# Patient Record
Sex: MALE | Race: NATIVE HAWAIIAN OR OTHER PACIFIC ISLANDER | NOT HISPANIC OR LATINO | URBAN - METROPOLITAN AREA
[De-identification: names, ages, dates, MRNs, and addresses within clinical notes are randomized per-mention and may not be internally consistent; named-entity substitution may affect disease eponyms.]

---

## 2020-12-23 ENCOUNTER — EMERGENCY (EMERGENCY)
Facility: HOSPITAL | Age: 35
LOS: 1 days | Discharge: HOME | End: 2020-12-23
Attending: EMERGENCY MEDICINE | Admitting: EMERGENCY MEDICINE
Payer: SELF-PAY

## 2020-12-23 VITALS
RESPIRATION RATE: 18 BRPM | HEIGHT: 66 IN | OXYGEN SATURATION: 97 % | HEART RATE: 78 BPM | SYSTOLIC BLOOD PRESSURE: 143 MMHG | WEIGHT: 175.05 LBS | TEMPERATURE: 98 F | DIASTOLIC BLOOD PRESSURE: 81 MMHG

## 2020-12-23 DIAGNOSIS — J45.901 UNSPECIFIED ASTHMA WITH (ACUTE) EXACERBATION: ICD-10-CM

## 2020-12-23 DIAGNOSIS — J45.909 UNSPECIFIED ASTHMA, UNCOMPLICATED: ICD-10-CM

## 2020-12-23 DIAGNOSIS — F17.200 NICOTINE DEPENDENCE, UNSPECIFIED, UNCOMPLICATED: ICD-10-CM

## 2020-12-23 PROCEDURE — 71046 X-RAY EXAM CHEST 2 VIEWS: CPT | Mod: 26

## 2020-12-23 PROCEDURE — 99284 EMERGENCY DEPT VISIT MOD MDM: CPT

## 2020-12-23 RX ORDER — IPRATROPIUM/ALBUTEROL SULFATE 18-103MCG
3 AEROSOL WITH ADAPTER (GRAM) INHALATION ONCE
Refills: 0 | Status: COMPLETED | OUTPATIENT
Start: 2020-12-23 | End: 2020-12-23

## 2020-12-23 RX ORDER — SODIUM CHLORIDE 9 MG/ML
1000 INJECTION INTRAMUSCULAR; INTRAVENOUS; SUBCUTANEOUS ONCE
Refills: 0 | Status: COMPLETED | OUTPATIENT
Start: 2020-12-23 | End: 2020-12-23

## 2020-12-23 RX ADMIN — SODIUM CHLORIDE 1000 MILLILITER(S): 9 INJECTION INTRAMUSCULAR; INTRAVENOUS; SUBCUTANEOUS at 23:31

## 2020-12-23 NOTE — ED PROVIDER NOTE - OBJECTIVE STATEMENT
35M with a h/o asthma, no h of intubation and has not had an exacerbation in a while so has no meds at home who p/w SOB x 3 days. He was given duoneb and solumedrol 125mg IV en route with some improvement but still feels like his lungs are tight. Endorses cough with white phlegm, no f/c, no sob, no leg swelling, no PE RF. Occassionally smokes (5-6 cig/week).

## 2020-12-23 NOTE — ED ADULT TRIAGE NOTE - ARRIVAL INFO ADDITIONAL COMMENTS
pt was at urgent care for covid swab and mentioned some left rib soreness, an ekg was done and thought to be abnormal and he was sent here.

## 2020-12-23 NOTE — ED PROVIDER NOTE - NSFOLLOWUPCLINICS_GEN_ALL_ED_FT
[facility]  Family Medicine  [facility]. [address]  [place], [place] [facility]  Phone: [phone]  Fax:   Follow Up Time:

## 2020-12-23 NOTE — ED PROVIDER NOTE - PATIENT PORTAL LINK FT
You can access the FollowMyHealth Patient Portal offered by [facility] by registering at the following website: [email or web address]. By joining [facility]’s FollowMyHealth portal, you will also be able to view your health information using other applications (apps) compatible with our system.

## 2020-12-23 NOTE — ED ADULT NURSE NOTE - OBJECTIVE STATEMENT
36 y/o male w/ PMH ?asthma presents to ED w/ c/o sudden onset difficulty breathing this evening while shaving - pt reports he does not have any rescue inhalers or prescription medications for his asthma so he steamed water and used vicks vapor rub to assist w/ his breathing w/o improvement, pt activated EMS. given neb tx and IV steroids by EMS in route w/ improvement. on arrival, pt denies SOB/CP/difficulty breathing, , O2 sat 93% on RA, and RR 22. pt denies recent illnesses, denies known sick contacts. denies fevers/chills/n/v/d 34 y/o male w/ PMH ?asthma presents to ED w/ c/o mild CP and SOB x3 days w/ sudden onset difficulty breathing this evening while shaving - pt reports he does not have any rescue inhalers or prescription medications for his asthma so he steamed water and used vicks vapor rub to assist w/ his breathing w/o improvement, pt activated EMS. given neb tx and IV steroids by EMS in route w/ improvement. on arrival, pt denies SOB/CP/difficulty breathing, , O2 sat 93% on RA, and RR 22. pt denies recent illnesses, denies known sick contacts. denies fevers/chills/n/v/d

## 2020-12-23 NOTE — ED PROVIDER NOTE - PROGRESS NOTE DETAILS
Pt feels much  better after neb and IVFs, wheezing improved, no resp distress.   RX for albuterol MDI and pred x 4 days sent, pt informed to f/u with PMD for mgmt of asthma. pt also advised to stop smoking.

## 2020-12-23 NOTE — ED PROVIDER NOTE - PHYSICAL EXAMINATION
GEN: Well appearing, well nourished, awake, alert, oriented to person, place, time/situation and in no apparent distress. NTAF  ENT: Airway patent, Nasal mucosa clear. Mouth with normal mucosa.  EYES: Clear bilaterally. PERRL, EOMI  RESPIRATORY: End expiratory wheezes, no hypoxia or resp distress.  CARDIAC: Regular rate and rhythm, no M/R/G  ABDOMEN: Soft, nontender, +bowel sounds, no rebound, rigidity, or guarding.  MSK: Range of motion is not limited, no deformities noted.  NEURO: Alert and oriented, no focal deficits.  SKIN: Skin normal color for race, warm, dry and intact. No evidence of rash.  PSYCH: Alert and oriented to person, place, time/situation. normal mood and affect. no apparent risk to self or others.

## 2020-12-23 NOTE — ED PROVIDER NOTE - NSFOLLOWUPINSTRUCTIONS_ED_ALL_ED_FT
Please follow up with your primary care physician. If you have any problem getting an appointment please call the ED Referral Coordinator at [phone].  Return to the Emergency Department if you have any new or worsening symptoms, or for any other concerns. Please read below for further information.    Asthma    Asthma is a condition in which the airways tighten and narrow, making it difficult to breath. Asthma episodes, also called asthma attacks, range from minor to life-threatening. Symptoms include wheezing, coughing, chest tightness, or shortness of breath. The diagnosis of asthma is made by a review of your medical history and a physical exam, but may involve additional testing. Asthma cannot be cured, but medicines and lifestyle changes can help control it. Avoid triggers of asthma which may include animal dander, pollen, mold, smoke, air pollutants, etc.     SEEK IMMEDIATE MEDICAL CARE IF YOU HAVE ANY OF THE FOLLOWING SYMPTOMS: worsening of symptoms, shortness of breath at rest, chest pain, bluish discoloration to lips or fingertips, lightheadedness/dizziness, or fever. Please take albuterol inhaler 2 puffs every 4 hours for 48 hrs and then after that as needed for shortness or breath, cough, and/or wheezing. Taking prednisone for 4 days. Follow up with primary care doctor for management of your asthma. Stop smoking.  Stay hydrated.    Please follow up with your primary care physician. If you have any problem getting an appointment please call the ED Referral Coordinator at [phone].  Return to the Emergency Department if you have any new or worsening symptoms, or for any other concerns. Please read below for further information.    Asthma    Asthma is a condition in which the airways tighten and narrow, making it difficult to breath. Asthma episodes, also called asthma attacks, range from minor to life-threatening. Symptoms include wheezing, coughing, chest tightness, or shortness of breath. The diagnosis of asthma is made by a review of your medical history and a physical exam, but may involve additional testing. Asthma cannot be cured, but medicines and lifestyle changes can help control it. Avoid triggers of asthma which may include animal dander, pollen, mold, smoke, air pollutants, etc.     SEEK IMMEDIATE MEDICAL CARE IF YOU HAVE ANY OF THE FOLLOWING SYMPTOMS: worsening of symptoms, shortness of breath at rest, chest pain, bluish discoloration to lips or fingertips, lightheadedness/dizziness, or fever.      How to Stop Smoking    WHAT YOU NEED TO KNOW:    You will improve your health and the health of others around you if you stop smoking. Your risk for heart and lung disease, cancer, stroke, heart attack, and vision problems will also decrease. Your adolescent can help prevent or stop harm to his or her brain or body. This will help him or her become a healthy adult. You can benefit from quitting no matter how long you have smoked.    DISCHARGE INSTRUCTIONS:    Prepare to stop smoking: Nicotine is a highly addictive drug found in cigarettes. Withdrawal symptoms can happen when you stop smoking and make it hard to quit. These include anxiety, depression, irritability, trouble sleeping, and increased appetite. You increase your chances of success if you prepare to quit.   •Set a quit date. Pick a date that is within the next 2 weeks. Do not pick a day that you think may be stressful or busy. Write down the day or [place] it on your calender.      •Tell friends and family that you plan to quit. Explain that you may have withdrawal symptoms when you try to quit. Ask them to support you. They may be able to encourage you and help reduce your stress to make it easier for you to quit.      •Make a list of your reasons for quitting. Put the list somewhere you will see it every day, such as your refrigerator. You can look at the list when you have a craving.      •Remove all tobacco and nicotine products from your home, car, and workplace. Also, remove anything else that will tempt you to smoke, such as lighters, matches, or ashtrays. Clean your car, home, and places at work that smell like smoke. The smell of smoke can trigger a craving.      •Identify triggers that make you want to smoke. This may include activities, feelings, or people. Also write down 1 way you can deal with each of your triggers. For example, if you want to smoke as soon as you wake up, plan another activity during this time, such as exercise.      •Make a plan for how you will quit. Learn about the tools that can help you quit, such as medicine, counseling, or nicotine replacement therapy. Choose at least 2 options to help you quit.      •Help your adolescent make a plan to quit. The plan will be more successful if your adolescent makes his or her own decisions. Do not try to pressure him or her to quit immediately or in a certain way. Be supportive and offer help if needed.      Tools to help you stop smoking:   •Counseling from a trained healthcare provider can provide you with support and skills to quit smoking. The provider will also teach you to manage your withdrawal symptoms and cravings. You may receive counseling from one counselor, in group therapy, or through phone therapy called a quit line.      •Nicotine replacement therapy (NRT) such as nicotine patches, gum, or lozenges may help reduce your nicotine cravings. You may get these without a doctor's order. Do not use e-cigarettes or smokeless tobacco in place of cigarettes or to help you quit. They still contain nicotine.      •Prescription medicines such as nasal sprays or nicotine inhalers may help reduce your withdrawal symptoms. Other medicines may also be used to reduce your urge to smoke. Ask your healthcare provider about these medicines. You may need to start certain medicines 2 weeks before your quit date for them to work well.      •Hypnosis is a practice that helps guide you through thoughts and feelings. Hypnosis may help decrease your cravings and make you more willing to quit.      •Acupuncture therapy uses very thin needles to balance energy channels in the body. This is thought to help decrease cravings and symptoms of nicotine withdrawal.      •Support groups let you talk to others who are trying to quit or have already quit. It may be helpful to speak with others about how they quit.      Manage your cravings:   •Avoid situations, people, and places that tempt you to smoke. Go to nonsmoking places, such as libraries or restaurants. Understand what tempts you and try to avoid these things.      •Keep your hands busy. Hold things such as a stress ball or pen.      •Put candy or toothpicks in your mouth. Keep lollipops, sugarless gum, or toothpicks with you at all times.      •Do not have alcohol or caffeine. These drinks may tempt you to smoke. Drink healthy liquids such as water or juice instead.      •Reward yourself when you resist your cravings. Rewards will motivate you and help you stay positive.      •Do an activity that distracts you from your craving. Examples include cleaning, creating art, or gardening.      Prevent weight gain after you quit: You may gain a few pounds after you quit smoking. It is healthier for you to gain a few pounds than to continue to smoke. The following can help you prevent weight gain:   •Eat healthy foods. These include fruits, vegetables, whole-grain breads, low-fat dairy products, beans, lean meats, and fish. Eat healthy snacks, such as low-fat yogurt, if you get hungry between meals.       •Drink water before, during, and between meals. This will make your stomach feel full and help prevent you from overeating. Ask your healthcare provider how much liquid to drink each day and which liquids are best for you.      •Be physically active. Activity may help reduce your cravings and reduce stress. Take a walk or do some kind of physical activity every day. Ask your healthcare provider which activities are right for you.  Family Walking for Exercise           For support and more information:   •Smokefree.gov  Phone: 1-166.871.2514  Web Address: www.smokefree.gov

## 2020-12-23 NOTE — ED ADULT NURSE NOTE - CHPI ED NUR SYMPTOMS POS
pt noted to have audible bilateral inspiratory wheezes at bases w/ decreased aeration/DIFFICULTY BREATHING/SHORTNESS OF BREATH/WHEEZING

## 2020-12-23 NOTE — ED ADULT NURSE NOTE - CHPI ED NUR SYMPTOMS NEG
no body aches/no chest pain/no chills/no cough/no diaphoresis/no edema/no fever/no headache/no hemoptysis

## 2020-12-23 NOTE — ED ADULT TRIAGE NOTE - CHIEF COMPLAINT QUOTE
PT BIBA w/ c/o dyspnea and chest tightness x 3 days. PT states hx of asthma, denies hx of intubation.
ekg abnormality

## 2020-12-23 NOTE — ED ADULT NURSE NOTE - CHIEF COMPLAINT QUOTE
PT BIBA w/ c/o dyspnea and chest tightness x 3 days. PT states hx of asthma, denies hx of intubation.

## 2020-12-24 VITALS
DIASTOLIC BLOOD PRESSURE: 70 MMHG | RESPIRATION RATE: 20 BRPM | SYSTOLIC BLOOD PRESSURE: 111 MMHG | TEMPERATURE: 98 F | OXYGEN SATURATION: 96 % | HEART RATE: 96 BPM

## 2020-12-24 PROCEDURE — 94640 AIRWAY INHALATION TREATMENT: CPT

## 2020-12-24 PROCEDURE — 99284 EMERGENCY DEPT VISIT MOD MDM: CPT | Mod: 25

## 2020-12-24 PROCEDURE — 96360 HYDRATION IV INFUSION INIT: CPT

## 2020-12-24 PROCEDURE — 71046 X-RAY EXAM CHEST 2 VIEWS: CPT

## 2020-12-24 RX ORDER — ALBUTEROL 90 UG/1
1 AEROSOL, METERED ORAL ONCE
Refills: 0 | Status: COMPLETED | OUTPATIENT
Start: 2020-12-24 | End: 2020-12-24

## 2020-12-24 RX ORDER — ALBUTEROL 90 UG/1
2 AEROSOL, METERED ORAL
Qty: 1 | Refills: 0
Start: 2020-12-24

## 2020-12-24 RX ADMIN — SODIUM CHLORIDE 1000 MILLILITER(S): 9 INJECTION INTRAMUSCULAR; INTRAVENOUS; SUBCUTANEOUS at 00:15

## 2020-12-24 RX ADMIN — ALBUTEROL 1 PUFF(S): 90 AEROSOL, METERED ORAL at 00:46

## 2020-12-24 RX ADMIN — Medication 3 MILLILITER(S): at 00:00
